# Patient Record
Sex: FEMALE | Race: BLACK OR AFRICAN AMERICAN | NOT HISPANIC OR LATINO | URBAN - METROPOLITAN AREA
[De-identification: names, ages, dates, MRNs, and addresses within clinical notes are randomized per-mention and may not be internally consistent; named-entity substitution may affect disease eponyms.]

---

## 2017-02-08 ENCOUNTER — EMERGENCY (EMERGENCY)
Facility: HOSPITAL | Age: 22
LOS: 0 days | Discharge: HOME | End: 2017-02-08
Attending: EMERGENCY MEDICINE
Payer: OTHER GOVERNMENT

## 2017-02-08 VITALS
RESPIRATION RATE: 18 BRPM | DIASTOLIC BLOOD PRESSURE: 67 MMHG | HEART RATE: 89 BPM | OXYGEN SATURATION: 100 % | SYSTOLIC BLOOD PRESSURE: 117 MMHG | TEMPERATURE: 99 F

## 2017-02-08 VITALS
RESPIRATION RATE: 18 BRPM | SYSTOLIC BLOOD PRESSURE: 111 MMHG | DIASTOLIC BLOOD PRESSURE: 64 MMHG | OXYGEN SATURATION: 100 % | WEIGHT: 134.92 LBS | HEART RATE: 121 BPM | HEIGHT: 63 IN

## 2017-02-08 DIAGNOSIS — G47.00 INSOMNIA, UNSPECIFIED: ICD-10-CM

## 2017-02-08 DIAGNOSIS — B34.9 VIRAL INFECTION, UNSPECIFIED: ICD-10-CM

## 2017-02-08 DIAGNOSIS — M54.9 DORSALGIA, UNSPECIFIED: ICD-10-CM

## 2017-02-08 DIAGNOSIS — J11.1 INFLUENZA DUE TO UNIDENTIFIED INFLUENZA VIRUS WITH OTHER RESPIRATORY MANIFESTATIONS: ICD-10-CM

## 2017-02-08 DIAGNOSIS — R53.83 OTHER FATIGUE: ICD-10-CM

## 2017-02-08 LAB
ALBUMIN SERPL ELPH-MCNC: 4.2 G/DL — SIGNIFICANT CHANGE UP (ref 3.3–5)
ALP SERPL-CCNC: 62 U/L — SIGNIFICANT CHANGE UP (ref 40–120)
ALT FLD-CCNC: 14 U/L — SIGNIFICANT CHANGE UP (ref 12–78)
ANION GAP SERPL CALC-SCNC: 10 MMOL/L — SIGNIFICANT CHANGE UP (ref 5–17)
AST SERPL-CCNC: 20 U/L — SIGNIFICANT CHANGE UP (ref 15–37)
BASOPHILS # BLD AUTO: 0.1 K/UL — SIGNIFICANT CHANGE UP (ref 0–0.2)
BASOPHILS NFR BLD AUTO: 2.4 % — HIGH (ref 0–2)
BILIRUB SERPL-MCNC: 0.5 MG/DL — SIGNIFICANT CHANGE UP (ref 0.2–1.2)
BUN SERPL-MCNC: 6 MG/DL — LOW (ref 7–23)
CALCIUM SERPL-MCNC: 8.8 MG/DL — SIGNIFICANT CHANGE UP (ref 8.5–10.1)
CHLORIDE SERPL-SCNC: 108 MMOL/L — SIGNIFICANT CHANGE UP (ref 96–108)
CO2 SERPL-SCNC: 22 MMOL/L — SIGNIFICANT CHANGE UP (ref 22–31)
CREAT SERPL-MCNC: 0.88 MG/DL — SIGNIFICANT CHANGE UP (ref 0.5–1.3)
EOSINOPHIL # BLD AUTO: 0 K/UL — SIGNIFICANT CHANGE UP (ref 0–0.5)
EOSINOPHIL NFR BLD AUTO: 1 % — SIGNIFICANT CHANGE UP (ref 0–6)
FLUAV SPEC QL CULT: NEGATIVE — SIGNIFICANT CHANGE UP
FLUBV AG SPEC QL IA: NEGATIVE — SIGNIFICANT CHANGE UP
GLUCOSE SERPL-MCNC: 86 MG/DL — SIGNIFICANT CHANGE UP (ref 70–99)
HCT VFR BLD CALC: 40.4 % — SIGNIFICANT CHANGE UP (ref 34.5–45)
HGB BLD-MCNC: 13.8 G/DL — SIGNIFICANT CHANGE UP (ref 11.5–15.5)
LYMPHOCYTES # BLD AUTO: 0.8 K/UL — LOW (ref 1–3.3)
LYMPHOCYTES # BLD AUTO: 16.8 % — SIGNIFICANT CHANGE UP (ref 13–44)
MAGNESIUM SERPL-MCNC: 2.3 MG/DL — SIGNIFICANT CHANGE UP (ref 1.8–2.4)
MCHC RBC-ENTMCNC: 32.4 PG — SIGNIFICANT CHANGE UP (ref 27–34)
MCHC RBC-ENTMCNC: 34.2 GM/DL — SIGNIFICANT CHANGE UP (ref 32–36)
MCV RBC AUTO: 94.7 FL — SIGNIFICANT CHANGE UP (ref 80–100)
MONOCYTES # BLD AUTO: 0.5 K/UL — SIGNIFICANT CHANGE UP (ref 0–0.9)
MONOCYTES NFR BLD AUTO: 10.6 % — SIGNIFICANT CHANGE UP (ref 2–14)
NEUTROPHILS # BLD AUTO: 3.3 K/UL — SIGNIFICANT CHANGE UP (ref 1.8–7.4)
NEUTROPHILS NFR BLD AUTO: 69.3 % — SIGNIFICANT CHANGE UP (ref 43–77)
PLATELET # BLD AUTO: 262 K/UL — SIGNIFICANT CHANGE UP (ref 150–400)
POTASSIUM SERPL-MCNC: 4 MMOL/L — SIGNIFICANT CHANGE UP (ref 3.5–5.3)
POTASSIUM SERPL-SCNC: 4 MMOL/L — SIGNIFICANT CHANGE UP (ref 3.5–5.3)
PROT SERPL-MCNC: 8.2 GM/DL — SIGNIFICANT CHANGE UP (ref 6–8.3)
RBC # BLD: 4.27 M/UL — SIGNIFICANT CHANGE UP (ref 3.8–5.2)
RBC # FLD: 11.8 % — SIGNIFICANT CHANGE UP (ref 11–15)
SODIUM SERPL-SCNC: 140 MMOL/L — SIGNIFICANT CHANGE UP (ref 135–145)
WBC # BLD: 4.8 K/UL — SIGNIFICANT CHANGE UP (ref 3.8–10.5)
WBC # FLD AUTO: 4.8 K/UL — SIGNIFICANT CHANGE UP (ref 3.8–10.5)

## 2017-02-08 PROCEDURE — 99284 EMERGENCY DEPT VISIT MOD MDM: CPT

## 2017-02-08 RX ORDER — MIRTAZAPINE 45 MG/1
0 TABLET, ORALLY DISINTEGRATING ORAL
Qty: 0 | Refills: 0 | COMMUNITY

## 2017-02-08 RX ORDER — TRAZODONE HCL 50 MG
0 TABLET ORAL
Qty: 0 | Refills: 0 | COMMUNITY

## 2017-02-08 RX ORDER — METHOCARBAMOL 500 MG/1
0 TABLET, FILM COATED ORAL
Qty: 0 | Refills: 0 | COMMUNITY

## 2017-02-08 RX ORDER — SODIUM CHLORIDE 9 MG/ML
1000 INJECTION INTRAMUSCULAR; INTRAVENOUS; SUBCUTANEOUS ONCE
Qty: 0 | Refills: 0 | Status: COMPLETED | OUTPATIENT
Start: 2017-02-08 | End: 2017-02-08

## 2017-02-08 RX ORDER — KETOROLAC TROMETHAMINE 30 MG/ML
30 SYRINGE (ML) INJECTION ONCE
Qty: 0 | Refills: 0 | Status: DISCONTINUED | OUTPATIENT
Start: 2017-02-08 | End: 2017-02-08

## 2017-02-08 RX ADMIN — Medication 30 MILLIGRAM(S): at 13:17

## 2017-02-08 RX ADMIN — SODIUM CHLORIDE 1000 MILLILITER(S): 9 INJECTION INTRAMUSCULAR; INTRAVENOUS; SUBCUTANEOUS at 13:21

## 2017-02-08 NOTE — ED PROVIDER NOTE - OBJECTIVE STATEMENT
Pertinent PMH/PSH/FHx/SHx and Review of Systems contained within:  21f hx of insomnia pw generalized body aches, fatigue, nausea, vomiting and cp. no sob  Fh and Sh not otherwise contributory  ROS otherwise negative

## 2017-02-08 NOTE — ED PROVIDER NOTE - MEDICAL DECISION MAKING DETAILS
patient with flu like symptoms. hydration, defervesce. patient with flu like symptoms. hydration, defervesce. influenza a and b neg. dc.

## 2017-02-08 NOTE — ED ADULT NURSE NOTE - OBJECTIVE STATEMENT
Patient is a/o x4 , sitting up in chair with c/o generalized body aches, fever and chills since last night.

## 2023-02-10 NOTE — ED ADULT NURSE NOTE - GASTROINTESTINAL WDL
No covid test needed    Two visitors are allowed into building. They must remain in designated area assigned by the nursing staff and can not eat in the facility. The visitor may have a drink if it is covered with a lid or cap. Please do not drink while care team members are in the room.  It will be an expectation for the patient/support person to wear a mask at all times during their stay.         Abdomen soft, nontender, nondistended, bowel sounds present in all 4 quadrants.

## 2023-05-01 ENCOUNTER — EMERGENCY (EMERGENCY)
Facility: HOSPITAL | Age: 28
LOS: 0 days | Discharge: HOME | End: 2023-05-01
Attending: STUDENT IN AN ORGANIZED HEALTH CARE EDUCATION/TRAINING PROGRAM
Payer: COMMERCIAL

## 2023-05-01 VITALS
RESPIRATION RATE: 16 BRPM | WEIGHT: 139.99 LBS | TEMPERATURE: 99 F | SYSTOLIC BLOOD PRESSURE: 129 MMHG | HEART RATE: 66 BPM | HEIGHT: 63 IN | DIASTOLIC BLOOD PRESSURE: 76 MMHG | OXYGEN SATURATION: 97 %

## 2023-05-01 VITALS
RESPIRATION RATE: 16 BRPM | OXYGEN SATURATION: 97 % | HEART RATE: 54 BPM | TEMPERATURE: 98 F | SYSTOLIC BLOOD PRESSURE: 108 MMHG | DIASTOLIC BLOOD PRESSURE: 76 MMHG

## 2023-05-01 DIAGNOSIS — Z91.041 RADIOGRAPHIC DYE ALLERGY STATUS: ICD-10-CM

## 2023-05-01 DIAGNOSIS — Z87.39 PERSONAL HISTORY OF OTHER DISEASES OF THE MUSCULOSKELETAL SYSTEM AND CONNECTIVE TISSUE: ICD-10-CM
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
## 2023-05-01 DIAGNOSIS — M54.2 CERVICALGIA: ICD-10-CM
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
## 2023-05-01 PROBLEM — M54.9 DORSALGIA, UNSPECIFIED: Chronic | Status: ACTIVE | Noted: 2017-02-08

## 2023-05-01 PROBLEM — G47.00 INSOMNIA, UNSPECIFIED: Chronic | Status: ACTIVE | Noted: 2017-02-08

## 2023-05-01 PROCEDURE — 99053 MED SERV 10PM-8AM 24 HR FAC: CPT

## 2023-05-01 PROCEDURE — 99284 EMERGENCY DEPT VISIT MOD MDM: CPT

## 2023-05-01 RX ORDER — KETOROLAC TROMETHAMINE 30 MG/ML
15 SYRINGE (ML) INJECTION ONCE
Refills: 0 | Status: DISCONTINUED | OUTPATIENT
Start: 2023-05-01 | End: 2023-05-01

## 2023-05-01 RX ORDER — LIDOCAINE 4 G/100G
1 CREAM TOPICAL ONCE
Refills: 0 | Status: COMPLETED | OUTPATIENT
Start: 2023-05-01 | End: 2023-05-01

## 2023-05-01 RX ORDER — LIDOCAINE 4 G/100G
1 CREAM TOPICAL
Qty: 4 | Refills: 0
Start: 2023-05-01 | End: 2023-05-10

## 2023-05-01 RX ADMIN — Medication 15 MILLIGRAM(S): at 02:11

## 2023-05-01 RX ADMIN — LIDOCAINE 1 PATCH: 4 CREAM TOPICAL at 02:11

## 2023-05-01 NOTE — ED PROVIDER NOTE - NSDCPRINTRESULTS_ED_ALL_ED
Patient requests all Lab, Cardiology, and Radiology Results on their Discharge Instructions
labs and imaging explained to patient, will f/u with PMD and ortho

## 2023-05-01 NOTE — ED ADULT NURSE NOTE - NS ED NOTE ABUSE SUSPICION NEGLECT YN
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 Follow standard national guidelines for essential work only, otherwise stay home.   AG No

## 2023-05-01 NOTE — ED ADULT NURSE REASSESSMENT NOTE - NS ED NURSE REASSESS COMMENT FT1
Pt AOx4 with steady gait. Pt reports pain at a tolerable level and accepts the d/cfrom the MD. Iv hep lock removed.
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
## 2023-05-01 NOTE — ED ADULT NURSE NOTE - CHPI ED NUR SYMPTOMS NEG
no acting out behaviors/no back pain/no bruising/no crying/no decreased eating/drinking/no difficulty bearing weight/no disorientation/no fussiness/no laceration/no loss of consciousness
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
## 2023-05-01 NOTE — ED PROVIDER NOTE - PHYSICAL EXAMINATION
General: No acute distress, mentation at baseline,  well nourished, well developed  HEENT: NCAT, Neck supple without meningismus, PERRL, no conjunctival injection. TTP along L paraspinal of C-spine, no midline tenderness  Lungs: CTAB, No wheeze or crackles, No retractions, No increased work of breathing  Heart: S1S2 RRR, No M/R/G, Pules equal Bilaterally in upper and lower extremities distally  Abd: soft, NT/ND, No guarding, No rebound.  No hernias, no palpable masses.  Extrem: FROM in all joints, no gross deformities appreciated, no significant edema noted, No ulcers. Cap refil < 2sec.  Skin: No rash noted, warm dry.  Neuro:   General: alert and oriented, mood and affect normal  Speech: normal, no aphasia or dysarthria  Cranial nerves: CN2-12 in tact  Peripheral exam: 5/5 motor strength in bilateral UEs and LEs, sensation intact, no pronator drift, normal finger to nose  Gait: normal with normal Romberg and tandem gait   Psychiatric: Appropriate mood and affect.

## 2023-05-01 NOTE — ED PROVIDER NOTE - PATIENT PORTAL LINK FT
You can access the FollowMyHealth Patient Portal offered by [facility] by registering at the following website: [email or web address]. By joining [employer]’s FollowMyHealth portal, you will also be able to view your health information using other applications (apps) compatible with our system.
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
## 2023-06-19 ENCOUNTER — EMERGENCY (EMERGENCY)
Facility: HOSPITAL | Age: 28
LOS: 1 days | Discharge: HOME | End: 2023-06-19
Attending: PERSONAL EMERGENCY RESPONSE ATTENDANT | Admitting: PERSONAL EMERGENCY RESPONSE ATTENDANT
Payer: OTHER GOVERNMENT

## 2023-06-19 VITALS
DIASTOLIC BLOOD PRESSURE: 77 MMHG | RESPIRATION RATE: 18 BRPM | HEART RATE: 77 BPM | SYSTOLIC BLOOD PRESSURE: 124 MMHG | TEMPERATURE: 98 F | OXYGEN SATURATION: 100 %

## 2023-06-19 VITALS
TEMPERATURE: 98 F | SYSTOLIC BLOOD PRESSURE: 115 MMHG | HEART RATE: 83 BPM | RESPIRATION RATE: 16 BRPM | OXYGEN SATURATION: 100 % | DIASTOLIC BLOOD PRESSURE: 73 MMHG

## 2023-06-19 DIAGNOSIS — F32.1 MAJOR DEPRESSIVE DISORDER, SINGLE EPISODE, MODERATE: ICD-10-CM

## 2023-06-19 DIAGNOSIS — F43.10 POST-TRAUMATIC STRESS DISORDER, UNSPECIFIED: ICD-10-CM

## 2023-06-19 LAB
ALBUMIN SERPL ELPH-MCNC: 4.4 G/DL — SIGNIFICANT CHANGE UP (ref 3.3–5)
ALP SERPL-CCNC: 42 U/L — SIGNIFICANT CHANGE UP (ref 40–120)
ALT FLD-CCNC: 9 U/L — SIGNIFICANT CHANGE UP (ref 4–33)
ANION GAP SERPL CALC-SCNC: 15 MMOL/L — HIGH (ref 7–14)
APAP SERPL-MCNC: <10 UG/ML — LOW (ref 15–25)
AST SERPL-CCNC: 26 U/L — SIGNIFICANT CHANGE UP (ref 4–32)
BASOPHILS # BLD AUTO: 0.08 K/UL — SIGNIFICANT CHANGE UP (ref 0–0.2)
BASOPHILS NFR BLD AUTO: 1.3 % — SIGNIFICANT CHANGE UP (ref 0–2)
BILIRUB SERPL-MCNC: 0.3 MG/DL — SIGNIFICANT CHANGE UP (ref 0.2–1.2)
BUN SERPL-MCNC: 8 MG/DL — SIGNIFICANT CHANGE UP (ref 7–23)
CALCIUM SERPL-MCNC: 9 MG/DL — SIGNIFICANT CHANGE UP (ref 8.4–10.5)
CHLORIDE SERPL-SCNC: 105 MMOL/L — SIGNIFICANT CHANGE UP (ref 98–107)
CO2 SERPL-SCNC: 19 MMOL/L — LOW (ref 22–31)
CREAT SERPL-MCNC: 0.7 MG/DL — SIGNIFICANT CHANGE UP (ref 0.5–1.3)
EGFR: 121 ML/MIN/1.73M2 — SIGNIFICANT CHANGE UP
EOSINOPHIL # BLD AUTO: 0.27 K/UL — SIGNIFICANT CHANGE UP (ref 0–0.5)
EOSINOPHIL NFR BLD AUTO: 4.3 % — SIGNIFICANT CHANGE UP (ref 0–6)
ETHANOL SERPL-MCNC: <10 MG/DL — SIGNIFICANT CHANGE UP
FLUAV AG NPH QL: SIGNIFICANT CHANGE UP
FLUBV AG NPH QL: SIGNIFICANT CHANGE UP
GLUCOSE SERPL-MCNC: 83 MG/DL — SIGNIFICANT CHANGE UP (ref 70–99)
HCG SERPL-ACNC: <1 MIU/ML — SIGNIFICANT CHANGE UP
HCT VFR BLD CALC: 37.9 % — SIGNIFICANT CHANGE UP (ref 34.5–45)
HGB BLD-MCNC: 12.4 G/DL — SIGNIFICANT CHANGE UP (ref 11.5–15.5)
IANC: 3.64 K/UL — SIGNIFICANT CHANGE UP (ref 1.8–7.4)
IMM GRANULOCYTES NFR BLD AUTO: 0.2 % — SIGNIFICANT CHANGE UP (ref 0–0.9)
LYMPHOCYTES # BLD AUTO: 1.82 K/UL — SIGNIFICANT CHANGE UP (ref 1–3.3)
LYMPHOCYTES # BLD AUTO: 28.8 % — SIGNIFICANT CHANGE UP (ref 13–44)
MCHC RBC-ENTMCNC: 32.7 GM/DL — SIGNIFICANT CHANGE UP (ref 32–36)
MCHC RBC-ENTMCNC: 32.7 PG — SIGNIFICANT CHANGE UP (ref 27–34)
MCV RBC AUTO: 100 FL — SIGNIFICANT CHANGE UP (ref 80–100)
MONOCYTES # BLD AUTO: 0.5 K/UL — SIGNIFICANT CHANGE UP (ref 0–0.9)
MONOCYTES NFR BLD AUTO: 7.9 % — SIGNIFICANT CHANGE UP (ref 2–14)
NEUTROPHILS # BLD AUTO: 3.64 K/UL — SIGNIFICANT CHANGE UP (ref 1.8–7.4)
NEUTROPHILS NFR BLD AUTO: 57.5 % — SIGNIFICANT CHANGE UP (ref 43–77)
NRBC # BLD: 0 /100 WBCS — SIGNIFICANT CHANGE UP (ref 0–0)
NRBC # FLD: 0 K/UL — SIGNIFICANT CHANGE UP (ref 0–0)
PLATELET # BLD AUTO: 331 K/UL — SIGNIFICANT CHANGE UP (ref 150–400)
POTASSIUM SERPL-MCNC: 3.8 MMOL/L — SIGNIFICANT CHANGE UP (ref 3.5–5.3)
POTASSIUM SERPL-SCNC: 3.8 MMOL/L — SIGNIFICANT CHANGE UP (ref 3.5–5.3)
PROT SERPL-MCNC: 7.4 G/DL — SIGNIFICANT CHANGE UP (ref 6–8.3)
RBC # BLD: 3.79 M/UL — LOW (ref 3.8–5.2)
RBC # FLD: 12.5 % — SIGNIFICANT CHANGE UP (ref 10.3–14.5)
RSV RNA NPH QL NAA+NON-PROBE: SIGNIFICANT CHANGE UP
SALICYLATES SERPL-MCNC: <0.3 MG/DL — LOW (ref 15–30)
SARS-COV-2 RNA SPEC QL NAA+PROBE: SIGNIFICANT CHANGE UP
SODIUM SERPL-SCNC: 139 MMOL/L — SIGNIFICANT CHANGE UP (ref 135–145)
TOXICOLOGY SCREEN, DRUGS OF ABUSE, SERUM RESULT: SIGNIFICANT CHANGE UP
TSH SERPL-MCNC: 1.68 UIU/ML — SIGNIFICANT CHANGE UP (ref 0.27–4.2)
WBC # BLD: 6.32 K/UL — SIGNIFICANT CHANGE UP (ref 3.8–10.5)
WBC # FLD AUTO: 6.32 K/UL — SIGNIFICANT CHANGE UP (ref 3.8–10.5)

## 2023-06-19 PROCEDURE — 99285 EMERGENCY DEPT VISIT HI MDM: CPT

## 2023-06-19 PROCEDURE — 90792 PSYCH DIAG EVAL W/MED SRVCS: CPT | Mod: GC

## 2023-06-19 RX ORDER — HALOPERIDOL DECANOATE 100 MG/ML
5 INJECTION INTRAMUSCULAR ONCE
Refills: 0 | Status: COMPLETED | OUTPATIENT
Start: 2023-06-19 | End: 2023-06-19

## 2023-06-19 RX ADMIN — Medication 2 MILLIGRAM(S): at 10:45

## 2023-06-19 RX ADMIN — HALOPERIDOL DECANOATE 5 MILLIGRAM(S): 100 INJECTION INTRAMUSCULAR at 10:45
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
## 2023-06-19 NOTE — ED ADULT NURSE NOTE - OBJECTIVE STATEMENT
Pt arrives via ems call made by  for worsening depression with SI. Pt became very agitated, aggressive, and combative on arrival to , unabel to redirect and yelling with threatening gestures. Placed in 4pt restraint from 1045 to 1130 and given IM meds for safety.

## 2023-06-19 NOTE — ED ADULT NURSE NOTE - NSFALLUNIVINTERV_ED_ALL_ED
Bed/Stretcher in lowest position, wheels locked, appropriate side rails in place/Call bell, personal items and telephone in reach/Instruct patient to call for assistance before getting out of bed/chair/stretcher/Non-slip footwear applied when patient is off stretcher/[place] to call system/Physically safe environment - no spills, clutter or unnecessary equipment/Purposeful proactive rounding/Room/bathroom lighting operational, light cord in reach

## 2023-06-19 NOTE — ED ADULT TRIAGE NOTE - TEMPERATURE IN CELSIUS (DEGREES C)
04/29/2009    Enthesopathy of hip region 07/14/2006    Urolithiasis 01/23/2006    Sicca syndrome ([facility] [address] 01/23/2006     Current Outpatient Prescriptions   Medication Sig Dispense Refill    clotrimazole (MYCELEX) 10 MG yuliya Take 1 tablet by mouth 5 times daily for 10 days 50 tablet 0    escitalopram (LEXAPRO) 10 MG tablet Take 10 mg by mouth daily      metFORMIN (GLUCOPHAGE-XR) 500 MG extended release tablet Take 1 tablet by mouth daily (with breakfast) 180 tablet 1    busPIRone (BUSPAR) 10 MG tablet Take 1 tablet by mouth 2 times daily 60 tablet 1    lamoTRIgine (LAMICTAL) 150 MG tablet Take 300 mg by mouth daily      ALPRAZolam (XANAX) 0.25 MG tablet Take 0.25 mg by mouth      venlafaxine (EFFEXOR XR) 150 MG extended release capsule TAKE 1 CAPSULE EVERY DAY 90 capsule 1    pravastatin (PRAVACHOL) 40 MG tablet TAKE 1 TABLET EVERY DAY 90 tablet 1    sulindac (CLINORIL) 200 MG tablet TAKE 1 TABLET TWICE DAILY WITH MEALS 180 tablet 1    montelukast (SINGULAIR) 10 MG tablet TAKE 1 TABLET EVERY DAY 90 tablet 1    losartan (COZAAR) 50 MG tablet Take 1 tablet by mouth daily 90 tablet 3    albuterol (PROVENTIL) (2.5 MG/3ML) 0.083% nebulizer solution Use one vial every 6 hours in nebulizer as needed 120 vial 2    Multiple Vitamins-Minerals (MULTIVITAMIN ADULT PO) Take 1 tablet by mouth      albuterol sulfate HFA (PROAIR HFA) 108 (90 BASE) MCG/ACT inhaler Inhale 2 puffs into the lungs every 6 hours as needed for Wheezing 1 Inhaler 1    ACCU-CHEK FASTCLIX LANCETS MISC TEST ONE TIME DAILY 102 each 3    ACCU-CHEK SMARTVIEW strip TEST ONE TIME DAILY 100 strip 3     No current facility-administered medications for this visit. Allergies: Azithromycin; Baclofen; Nuts [peanut oil]; Peanut-containing drug products; Sulfa antibiotics; Adalimumab; Avelox [moxifloxacin hcl in nacl]; Bactrim; Infliximab; Lisinopril; Zithromax [azithromycin dihydrate];  Adhesive tape; Moxifloxacin; and Sulfamethoxazole-trimethoprim  Past Medical History:   Diagnosis Date    Anxiety     Arrhythmia     Asthma     Chronic back pain     Depression     Diabetes mellitus type II     Dyshidrotic dermatitis     Elevated blood pressure reading without diagnosis of hypertension     Hyperlipidemia     Kidney stone     Lump or mass in breast     Orthostatic hypotension     Panic disorder     Personal history of renal calculi     kidney stones [id number]    Pneumonia 2007    RA (rheumatoid arthritis) ([facility] [address]     chronic    Scoliosis     Unspecified sleep apnea      Past Surgical History:   Procedure Laterality Date    APPENDECTOMY  1978    incidental    BREAST SURGERY  2002    breast tissue removed from right axillary     CATARACT REMOVAL Bilateral 10/2016    COLONOSCOPY  7.10.09    normal; Dr Namita Turpin    cyst on left ovary -removal of left ovary    ECTOPIC PREGNANCY SURGERY  1978    removal of left tube     FOOT SURGERY  9/1998    B/L release of the fascia     HYSTERECTOMY  1986    removal of uterus    SALPINGO-OOPHORECTOMY  7772,7248    left ovary removed following cyst removal, right tube and ovary removed due to adhesion    SINUS SURGERY  1994    polyps in sinuses    SPINE SURGERY  11/2016    lumbar     WRIST SURGERY  3/2003    repair of left wrist torn ligament-tenosynovitis release right      Family History   Problem Relation Age of Onset    Cancer Mother      breast cancer in her 48d   Georganna Duverney Diabetes Mother     High Blood Pressure Mother     Stroke Maternal Grandmother     Cancer Paternal Grandfather      bladder cancer    Diabetes Paternal Aunt     Diabetes Paternal Grandmother      Social History   Substance Use Topics    Smoking status: Current Every Day Smoker     Packs/day: 0.75     Years: 30.00     Start date: 3/14/1975    Smokeless tobacco: Never Used      Comment: 1 cig per day, vaporing instead    Alcohol use No     Review of Systems   Constitutional: Negative 36.7

## 2023-06-19 NOTE — ED BEHAVIORAL HEALTH ASSESSMENT NOTE - NSBHMSEATTEN_PSY_A_CORE
a little sedated from PRNs but able to participate in interview/Impaired a little sedated from PRNs but able to participate in interview/Normal

## 2023-06-19 NOTE — ED BEHAVIORAL HEALTH ASSESSMENT NOTE - NSBHATTESTCOMMENTATTENDFT_PSY_A_CORE
28F with a history of depression, post-traumatic stress disorder, one prior admission, no prior suicide attempt, presents with EMS activated by  for a suicidal statement.  is in  [place], was talking on phone to patient, had an argument, and pt allegedly made a suicidal statement about jumping off a bridge, pt then turned off her phone and was unreachable, so  became concerned and called police. Patient was agitated and uncooperative upon arrival requiring medication/restraints. Patient admits to feeling depressed, lonely and isolated, states she has family/marital stressors but does not give details. She denies current suicidal ideation intent or plan. Reluctantly admits to making statement last night, did not engage in any self harm or preparatory acts for suicide. No prior attempts. Not otherwise manic/psychotic or intoxicated. Mother without safety concerns and able to provide support for pt. Patient was offered and declined admission, based on evaluation, as pt denies any SHIIP, admits to making suicidal statement when upset but without engaging in any suicidal behavior, she does not meet dangerousness criteria for involuntary commitment at this time. Will provide resources to Crisis Center and  Liaison services. Patient agreeable to follow up care, will provide an urgent referral. Patient participated with safety planning, plan also reviewed with mother who is aware to access urgent or emergency services if pt symptoms worsen.

## 2023-06-19 NOTE — ED BEHAVIORAL HEALTH NOTE - BEHAVIORAL HEALTH NOTE
Patient sedated and sleeping after receiving PRNs (Haldol 5mg, Ativan 4mg) due to agitation and threatening and hitting staff. Patient unable to be interviewed at this time due to sedation. Will assess when patient is not sedated.

## 2023-06-19 NOTE — ED ADULT NURSE REASSESSMENT NOTE - NS ED NURSE REASSESS COMMENT FT1
Positive side effects of medications, pt is calm and cooperative. RN able to obtain blood work. Safety measures maintained.

## 2023-06-19 NOTE — ED PROVIDER NOTE - CARE PLAN
1 Principal Discharge DX:	Current moderate episode of major depressive disorder, unspecified whether recurrent  Secondary Diagnosis:	PTSD (post-traumatic stress disorder)

## 2023-06-19 NOTE — ED PROVIDER NOTE - NSFOLLOWUPINSTRUCTIONS_ED_ALL_ED_FT
Follow up with your primary care physician and psychiatrist in 48-72 hours.  You may also see the psychiatrist at [facility]:    [address] [place] [address]  Phone: [phone]    [facility] on [facility] Information:    -Walk-in hours: Monday to Friday, 9am to 3pm   -Almost all walk-in patients will be able to see a psych prescriber the same day   -Scheduled, non-urgent, evening remote/virtual appointments are available on a limited basis. Call our  to inquire about these: [phone]. A crisis center clinician screens these requests in the late afternoon and if appropriate it takes a few days to set-up.   -Visits take about 2 to 4 hours total   -Mornings are the best time for patients to arrive    For Telehealth options try:  [employer]: [employer] (to access psychiatrist or therapist)  [employer]: [employer] (to access psychiatrist or therapist)  Better Help: betterhelp.com (Largest online therapy group)      SEEK IMMEDIATE MEDICAL CARE IF YOU HAVE ANY OF THE FOLLOWING SYMPTOMS: thoughts about hurting or killing yourself, thoughts about hurting or killing somebody else, hallucinations or any other worsening or persistent symptoms OR ANY NEW OR CONCERNING SYMPTOMS.
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
## 2023-06-19 NOTE — ED PROVIDER NOTE - CLINICAL SUMMARY MEDICAL DECISION MAKING FREE TEXT BOX
Attending MD Shore.  Pt seen in [facility] and amenable to psych eval.  Report of SI.  Pt transported to  but prior to psych eval has become agitated.  On my arrival to  pt is not deescalating with verbal reassurance/attempt.  Medication administered for pt and staff safety.  Pt to be seen by psychiatry.  Hemodynamically stable at time of signout to incoming team pending awakening and psych eval.

## 2023-06-19 NOTE — ED BEHAVIORAL HEALTH ASSESSMENT NOTE - NSSUICPROTFACT_PSY_ALL_CORE
Identifies reasons for living/Fear of death or the actual act of killing self Identifies reasons for living/Fear of death or the actual act of killing self/Episcopal beliefs

## 2023-06-19 NOTE — ED BEHAVIORAL HEALTH ASSESSMENT NOTE - DETAILS
informed of plan agitated in ED requiring PRNs and restraints discharged from [facility] 7-8 years ago see HPI refuses to elaborate see safety plan /mother informed of plan

## 2023-06-19 NOTE — ED ADULT NURSE NOTE - NS_BH TRG QUESTION4_ED_ALL_ED
HR=69 bpm, AIXP=983/101 mmhg, TvV5=735.0 %, Resp=16 B/min, EtCO2=40 mmHg, Apnea=6 Seconds, Comment=NSR Yes

## 2023-06-19 NOTE — ED BEHAVIORAL HEALTH ASSESSMENT NOTE - SAFETY PLAN ADDT'L DETAILS
Safety plan discussed with.../Education provided regarding environmental safety / lethal means restriction/Provision of National Suicide Prevention Lifeline 6-592-877-MHEU (8650)

## 2023-06-19 NOTE — ED PROVIDER NOTE - OBJECTIVE STATEMENT
Attending MD Shore.  Pt is a 27 yo fem with pmhx of PTSD [facility] reported call by spouse endorsing SI.  On arrival to Ed pt mentating, verbal but evasive.  She denies SI/HI but states that she is willing to discuss with psychiatry.  Denies phyiscal complaints.  Cursory exam on arrival without identifiable pathology.

## 2023-06-19 NOTE — ED PROVIDER NOTE - PROGRESS NOTE DETAILS
REINALDO Scott- pt currently sleeping, labs results and psychiatric eval pending. NP Sonia- pt feels much improved, a&ox 4, no behavioral issues, psych recommendation out patient follow up.

## 2023-06-19 NOTE — ED BEHAVIORAL HEALTH ASSESSMENT NOTE - OTHER PAST PSYCHIATRIC HISTORY (INCLUDE DETAILS REGARDING ONSET, COURSE OF ILLNESS, INPATIENT/OUTPATIENT TREATMENT)
1x prior admission 7-8 years ago after DC from [employer]. States admission for suicidal ideation.  Previously had psychiatrist and was on medications, that were helpful but stopped taking several years ago. Previously had therapist but none currently.  Previously diagnosed with: Depression, Anxiety, PTSD  denies prior suicide attempts

## 2023-06-19 NOTE — CHART NOTE - NSCHARTNOTEFT_GEN_A_CORE
Per provider, patient is cleared and is able to return home. Patient requested a metrocard to get her home. Patient provided with metro card #[id number]. No further SW interventions needed at this time. You are being discharged to:  [address] [place] [id number]  Phone: [phone]     [facility] Crisis Intervention: [phone]  *National Suicide Prevention Lifeline:  4-215.242.2060  *Peer Support Talk Line (Seven days a week, 1:00 PM  9:00 PM) Call: [phone] or Text: [address] on [address] [facility] ([facility]) HELPLINE: [phone]/Website: www mabel org  *Substance Abuse and [address] [facility] [employer], which is a confidential, free, 24-hour-a-day, 365-day-a-year, information service for individuals and family members facing mental health and/or substance use disorders  This service provides referrals to local treatment facilities, support groups, and community-based organizations  Callers can also order free publications and other information  Call 3-605.662.2243/Website: www [facility] gov  *[facility] 2-1-1: This is a toll free, confidential, 24-hour-a-day service which connects you to a community  in your area who can help you find services and resources that are available to you locally and provide critical services that can improve and save lives  Call: 80  /Website: https://[employer]uke/       What you need to know aboutcoronavirus disease 2019 (COVID-19)     What is coronavirus disease 2019 (COVID-19)? Coronavirus disease 2019 (COVID-19) is a respiratory illness that can spread from person to person  The virus that causes COVID-19 is a novel coronavirus that was first identified during an investigation into an outbreak in [place], [place]  Can people in the [place]  get COVID-19? Yes  COVID-19 is spreading from person to person in parts of the [place]  Risk of infection with COVID-19 is higher for people who are close contacts of someone known to have COVID-19, for example healthcare workers, or household members   Other people at higher risk for infection are those who live in or have recently been in an area with ongoing spread of COVID-19  Learn more about places with ongoing spread at   [facility]  html#geographic  Have there been cases of COVID-19 in the [place] ?   Yes  The first case of COVID-19 in the [place] was reported on January 21, 2020  The current count of cases of COVID-19 in the [place] is available on [employer] at [facility]  How does COVID-19 spread? The virus that causes COVID-19 probably emerged from an animal source, but is now spreading from person to person  The virus is thought to spread mainly between people who are in close contact with one another (within about 6 feet) through respiratory droplets produced when an infected person coughs or sneezes  It also may be possible that a person can get COVID-19 by touching a surface or object that has the virus on it and then touching their own mouth, nose, or possibly their eyes, but this is not thought to be the main way the virus spreads  Learn what is known about the spread of newly emerged coronaviruses at [facility]  What are the symptoms of COVID-19? Patients with COVID-19 have had mild to severe respiratory illness with symptoms of   fever   cough   shortness of breath    What are severe complications from this virus? Some patients have pneumonia in both lungs, multi-organ failure and in some cases death  How can I help protect myself? People can help protect themselves from respiratory illness with everyday preventive actions  Avoid close contact with people who are sick  Avoid touching your eyes, nose, and mouth withunwashed hands  Wash your hands often with soap and water for at least 20 seconds  Use an alcohol-based hand  that contains at least 60% alcohol if soap and water are not available      If you are sick, to keep from spreading respiratory illness to others, you should   Stay home when you are sick  Cover your cough or sneeze with a tissue, then throw the tissue in the trash  Clean and disinfect frequently touched objectsand surfaces  What should I do if I recently traveled from an area with ongoing spread of COVID-19? If you have traveled from an affected area, there may be restrictions on your movements for up to 2 weeks  If you develop symptoms during that period (fever, cough, trouble breathing), seek medical advice  Call the office of your health care provider before you go, and tell them about your travel and your symptoms  They will give you instructions on how to get care without exposing other people to your illness  While sick, avoid contact with people, don't go out and delay any travel to reduce the possibility of spreading illness to others  Is there a vaccine? There is currently no vaccine to protect against COVID-19  The best way to prevent infection is to take everyday preventive actions, like avoiding close contact with people who are sick and washing your hands often  Is there a treatment? There is no specific antiviral treatment for COVID-19  People with COVID-19 can seek medical care to helprelieve symptoms  For more information: www cdc gov/IKGTH60IO 704664-J 03/03/2020     What to do if you are sick withcoronavirus disease 2019 (COVID-19)     If you are sick with COVID-19 or suspect you are infected with the virus that causes COVID-19, follow the steps below to help prevent the disease from spreading to people in your home and community  Stay home except to get medical care   You should restrict activities outside your home, except for getting medical care  Do not go to work, school, or public areas  Avoid using public transportation, ride-sharing, or taxis    Separate yourself from other people and animals inyour home  People: As much as possible, you should stay in a specific room and away from other people in your home  Also, you should use a separate bathroom, if available  Animals: Do not handle pets or other animals while sick  See COVID-19 and Animals for more information  Call ahead before visiting your doctor   If you have a medical appointment, call the healthcare provider and tell them that you have or may have COVID-19  This will help the healthcare provider's office take steps to keep other people from getting infected or exposed  Wear a facemask  You should wear a facemask when you are around other people (e g , sharing a room or vehicle) or pets and before you enter a healthcare provider's office  If you are not able to wear a facemask (for example, because it causes trouble breathing), then people who live with you should not stay in the same room with you, or they should wear a facemask if they enteryour room  Cover your coughs and sneezes   Cover your mouth and nose with a tissue when you cough or sneeze  Throw used tissues in a lined trash can; immediately wash your hands with soap and water for at least 20 seconds or clean your hands with an alcohol-based hand  that contains at least 60 to 95% alcohol, covering all surfaces of your hands and rubbing them together until they feel dry  Soap and water should be used preferentially if hands are visibly dirty  Avoid sharing personal household items   You should not share dishes, drinking glasses, cups, eating utensils, towels, or bedding with other people or pets in your home  After using these items, they should be washed thoroughly with soap and water  Clean your hands often  Wash your hands often with soap and water for at least 20 seconds  If soap and water are not available, clean your hands with an alcohol-based hand  that contains at least 60% alcohol, covering all surfaces of your hands and rubbing them together until they feel dry  Soap and water should be used preferentially if hands are visibly dirty   Avoid touching your eyes, nose, and mouth with unwashed hands  Clean all "high-touch" surfaces every day  High touch surfaces include counters, tabletops, doorknobs, bathroom fixtures, toilets, phones, keyboards, tablets, and bedside tables  Also, clean any surfaces that may have blood, stool, or body fluids on them  Use a household cleaning spray or wipe, according to the label instructions  Labels contain instructions for safe and effective use of the cleaning product including precautions you should take when applying the product, such as wearing gloves and making sure you have good ventilation during use of the product  Monitor your symptoms  Seek prompt medical attention if your illness is worsening (e g , difficulty breathing)  Before seeking care, call your healthcare provider and tell them that you have, or are being evaluated for, COVID-19  Put on a facemask before you enter the facility  These steps will help the healthcare provider's office to keep other people in the office or waiting room from getting infectedor exposed  Ask your healthcare provider to call the local or state health department  Persons who are placed under active monitoring or facilitated self-monitoring should follow instructions provided by their local health department or occupational health professionals, as appropriate  If you have a medical emergency and need to call 911, notify the dispatch personnel that you have, or are being evaluated for COVID-19  If possible, put on a facemask before emergency medical services arrive  Discontinuing home isolation  Patients with confirmed COVID-19 should remain under home isolation precautions until the risk of secondary transmission to others is thought to be low  The decision to discontinue home isolation precautions should be made on a case-by-case basis, in consultation with healthcare providers and state and local health departments    For more information: www cdc gov/QPWCV28GO 106393-B 02/24/2020 Stay home when you are sick,except to get medical care  Wash your hands often with soap and water for at least 20 seconds  Cover your cough or sneeze with a tissue, then throw the tissue in the trash  Clean and disinfect frequently touched objects and surfaces  Avoid touching your eyes, nose, and mouth  STOP THE SPREAD OF GERMS  For more information: www cdc gov/COVID19 Avoid close contact with people who are sick  Help prevent the spread of respiratory diseases like COVID-19

## 2023-06-19 NOTE — ED BEHAVIORAL HEALTH ASSESSMENT NOTE - PAST PSYCHOTROPIC MEDICATION
Remeron and Trazodone (unclear dose)  states may have been on one other medication but doesn't remember name

## 2023-06-19 NOTE — ED BEHAVIORAL HEALTH ASSESSMENT NOTE - SUMMARY
Patient is a 29y/o female currently domiciled with mother ( does not live with patient, currently [place] the past 5 years) with a prior psychiatric history of diagnoses of depression, anxiety and PTSD (not on any current medications), 1x prior admission after DC from [employer] (refuses to elaborate), denies prior SA, and +cannabis use who presents after  called EMS after patient made suicidal statement and threat to  and then proceeded to turn off phone.     On assessment, patient is calm and cooperative. Admits to making impulsive suicidal statements, in the context of getting into an argument with , denies any intent or specific plan. Denies any prior suicide attempts or prior history of suicidal ideation. Does report worsening depression 2/2 feeling unappreciated and uncared for by her  who is overseas. Was able to safety plan and willing to be connected with provider and be in therapy, and mother was also willing to support patient and regularly check in for worsening symptoms and suicidal ideation. Though patient is at chronic elevated risk of suicide patient is not at acute risk and is able to safety plan and has social supports at home, therefore patient to be discharged home.    Plan:  1. Discharge home, discussed with mother about safety plan. Mother will regularly check in on patient and check for any worsening symptoms. Given suicide hotline number, crisis center number and told to call 911 or come to ED for any acute safety concerns.  2. Gave patient  [facility] resources to help get connected with care and get additional resources/support Patient is a 29y/o female currently domiciled with mother ( does not live with patient, currently [place] on duty for the past 5 years) with a prior psychiatric history of diagnoses of depression, anxiety and PTSD (not on any current medications), 1x prior admission after DC from [employer] (refuses to elaborate), denies prior SA, and +cannabis use who presents after  called EMS after patient made suicidal statement and threat to  and then proceeded to turn off phone.     On assessment, patient is calm and cooperative. Admits to making impulsive suicidal statements, in the context of getting into an argument with , denies any intent or specific plan. Denies any prior suicide attempts or prior history of suicidal ideation. Does report worsening depression 2/2 feeling unappreciated and uncared for by her  who is overseas, has no current SI. Was able to safety plan and willing to be connected with provider and be in therapy, and mother was also willing to support patient and regularly check in for worsening symptoms and suicidal ideation. Though patient is at chronic elevated risk of suicide patient is not at acute risk and is able to safety plan and has social supports at home, therefore patient to be discharged home.    Plan:  1. Discharge home, discussed with mother about safety plan. Mother will regularly check in on patient and check for any worsening symptoms. Given suicide hotline number, crisis center number and told to call 911 or come to ED for any acute safety concerns.  2. Gave patient  [facility] resources to help get connected with care and get additional resources/support

## 2023-06-19 NOTE — ED ADULT TRIAGE NOTE - CHIEF COMPLAINT QUOTE
Pt presents to ED via EMS from  home with c/o depression. Pt reports she was diagnosed with PTSD and depression over a year ago but has not been compliant with medications for approx 1 year. Pt sent a text to her  with suicidal ideation last night, pt currently denies suicidal ideation, homicidal ideation, auditory or visual hallucinations. MD called for  silvano.

## 2023-06-19 NOTE — ED BEHAVIORAL HEALTH ASSESSMENT NOTE - REASON FOR REFERRAL
called after patient make impulsive suicidal statement after getting into an argument over the phone

## 2023-06-19 NOTE — ED BEHAVIORAL HEALTH ASSESSMENT NOTE - REFERRAL / APPOINTMENT DETAILS
discharge home, given resources to suicide hotline, crisis center, and  liaison resources discharge home, given resources to suicide hotline, crisis center, and  liaison resources. SW to assist with urgent referral

## 2023-06-19 NOTE — ED PROVIDER NOTE - PATIENT PORTAL LINK FT
You can access the FollowMyHealth Patient Portal offered by [facility] by registering at the following website: [email or web address]. By joining [facility]’s FollowMyHealth portal, you will also be able to view your health information using other applications (apps) compatible with our system.

## 2023-06-19 NOTE — ED BEHAVIORAL HEALTH ASSESSMENT NOTE - DESCRIPTION
none known Initially agitated and upset when presented to  ED. Required PRNs (IM Haldol 5mg, Ativan 4mg) and restraints. After initial PRNs patient was calm and cooperative and did not require additional PRNs or restraints.  Vital Signs Last 24 Hrs  T(C): 36.8 (19 Jun 2023 16:40), Max: 36.8 (19 Jun 2023 11:15)  T(F): 98.3 (19 Jun 2023 16:40), Max: 98.3 (19 Jun 2023 11:15)  HR: 87 (19 Jun 2023 13:06) (66 - 87)  BP: 119/72 (19 Jun 2023 13:06) (112/65 - 124/77)  BP(mean): --  RR: 18 (19 Jun 2023 13:06) (18 - 18)  SpO2: 100% (19 Jun 2023 13:06) (100% - 100%)    Parameters below as of 19 Jun 2023 13:06  Patient On (Oxygen Delivery Method): room air mother lives in floor above, patient lives in basement of house.  working [place] for [employer] (for the past 5 years). patient not currently employed, receives financial support because she is a

## 2023-06-19 NOTE — ED PROVIDER NOTE - NS ED ATTENDING STATEMENT MOD
This was a shared visit with the LILLIE. I reviewed and verified the documentation and independently performed the documented:

## 2023-06-20 NOTE — ED BEHAVIORAL HEALTH NOTE - BEHAVIORAL HEALTH NOTE
Urgent referral/high risk log:  Worker found patient on white board for urgent referral. Worker called patient ([phone]) and could not leave a message because voicemail is full. worker called another time and voicemail is full cannot leave message.

## 2024-02-15 NOTE — ED ADULT NURSE NOTE - CHIEF COMPLAINT QUOTE
Saul zuniga OTC meds.     body aches with fever and vomiting since last night. Pt denies any abdominal pain